# Patient Record
Sex: FEMALE | Race: BLACK OR AFRICAN AMERICAN | Employment: FULL TIME | URBAN - METROPOLITAN AREA
[De-identification: names, ages, dates, MRNs, and addresses within clinical notes are randomized per-mention and may not be internally consistent; named-entity substitution may affect disease eponyms.]

---

## 2022-12-14 ENCOUNTER — HOSPITAL ENCOUNTER (EMERGENCY)
Dept: MRI IMAGING | Age: 42
Discharge: HOME | End: 2022-12-14
Attending: STUDENT IN AN ORGANIZED HEALTH CARE EDUCATION/TRAINING PROGRAM
Payer: COMMERCIAL

## 2022-12-14 ENCOUNTER — APPOINTMENT (OUTPATIENT)
Dept: CT IMAGING | Age: 42
End: 2022-12-14
Attending: STUDENT IN AN ORGANIZED HEALTH CARE EDUCATION/TRAINING PROGRAM
Payer: COMMERCIAL

## 2022-12-14 ENCOUNTER — HOSPITAL ENCOUNTER (EMERGENCY)
Age: 42
Discharge: HOME | End: 2022-12-14
Attending: STUDENT IN AN ORGANIZED HEALTH CARE EDUCATION/TRAINING PROGRAM
Payer: COMMERCIAL

## 2022-12-14 VITALS
SYSTOLIC BLOOD PRESSURE: 106 MMHG | DIASTOLIC BLOOD PRESSURE: 76 MMHG | BODY MASS INDEX: 27.51 KG/M2 | OXYGEN SATURATION: 100 % | WEIGHT: 149.47 LBS | TEMPERATURE: 98.2 F | HEART RATE: 60 BPM | RESPIRATION RATE: 12 BRPM | HEIGHT: 62 IN

## 2022-12-14 DIAGNOSIS — R20.0 LEFT UPPER EXTREMITY NUMBNESS: ICD-10-CM

## 2022-12-14 DIAGNOSIS — R20.0 LEFT FACIAL NUMBNESS: Primary | ICD-10-CM

## 2022-12-14 LAB
ALBUMIN SERPL-MCNC: 4 G/DL (ref 3.5–5)
ALBUMIN/GLOB SERPL: 1 {RATIO} (ref 1.1–2.2)
ALP SERPL-CCNC: 65 U/L (ref 45–117)
ALT SERPL-CCNC: 19 U/L (ref 12–78)
ANION GAP SERPL CALC-SCNC: 9 MMOL/L (ref 5–15)
AST SERPL-CCNC: 22 U/L (ref 15–37)
BASOPHILS # BLD: 0 K/UL (ref 0–0.1)
BASOPHILS NFR BLD: 1 % (ref 0–1)
BILIRUB SERPL-MCNC: 0.3 MG/DL (ref 0.2–1)
BUN SERPL-MCNC: 9 MG/DL (ref 6–20)
BUN/CREAT SERPL: 10 (ref 12–20)
CALCIUM SERPL-MCNC: 9 MG/DL (ref 8.5–10.1)
CHLORIDE SERPL-SCNC: 104 MMOL/L (ref 97–108)
CO2 SERPL-SCNC: 28 MMOL/L (ref 21–32)
CREAT SERPL-MCNC: 0.89 MG/DL (ref 0.55–1.02)
DIFFERENTIAL METHOD BLD: NORMAL
EOSINOPHIL # BLD: 0.1 K/UL (ref 0–0.4)
EOSINOPHIL NFR BLD: 1 % (ref 0–7)
ERYTHROCYTE [DISTWIDTH] IN BLOOD BY AUTOMATED COUNT: 13.2 % (ref 11.5–14.5)
GLOBULIN SER CALC-MCNC: 4.1 G/DL (ref 2–4)
GLUCOSE BLD STRIP.AUTO-MCNC: 83 MG/DL (ref 65–117)
GLUCOSE SERPL-MCNC: 82 MG/DL (ref 65–100)
HCT VFR BLD AUTO: 41.1 % (ref 35–47)
HGB BLD-MCNC: 13.7 G/DL (ref 11.5–16)
IMM GRANULOCYTES # BLD AUTO: 0 K/UL (ref 0–0.04)
IMM GRANULOCYTES NFR BLD AUTO: 0 % (ref 0–0.5)
INR PPP: 1.1 (ref 0.9–1.1)
LYMPHOCYTES # BLD: 2.7 K/UL (ref 0.8–3.5)
LYMPHOCYTES NFR BLD: 37 % (ref 12–49)
MCH RBC QN AUTO: 30.2 PG (ref 26–34)
MCHC RBC AUTO-ENTMCNC: 33.3 G/DL (ref 30–36.5)
MCV RBC AUTO: 90.5 FL (ref 80–99)
MONOCYTES # BLD: 0.7 K/UL (ref 0–1)
MONOCYTES NFR BLD: 10 % (ref 5–13)
NEUTS SEG # BLD: 3.6 K/UL (ref 1.8–8)
NEUTS SEG NFR BLD: 51 % (ref 32–75)
NRBC # BLD: 0 K/UL (ref 0–0.01)
NRBC BLD-RTO: 0 PER 100 WBC
PLATELET # BLD AUTO: 223 K/UL (ref 150–400)
PMV BLD AUTO: 11 FL (ref 8.9–12.9)
POTASSIUM SERPL-SCNC: 3.9 MMOL/L (ref 3.5–5.1)
PROT SERPL-MCNC: 8.1 G/DL (ref 6.4–8.2)
PROTHROMBIN TIME: 10.6 SEC (ref 9–11.1)
RBC # BLD AUTO: 4.54 M/UL (ref 3.8–5.2)
SERVICE CMNT-IMP: NORMAL
SODIUM SERPL-SCNC: 141 MMOL/L (ref 136–145)
WBC # BLD AUTO: 7.1 K/UL (ref 3.6–11)

## 2022-12-14 PROCEDURE — 70551 MRI BRAIN STEM W/O DYE: CPT

## 2022-12-14 PROCEDURE — 99285 EMERGENCY DEPT VISIT HI MDM: CPT

## 2022-12-14 PROCEDURE — 74011000636 HC RX REV CODE- 636: Performed by: STUDENT IN AN ORGANIZED HEALTH CARE EDUCATION/TRAINING PROGRAM

## 2022-12-14 PROCEDURE — 70496 CT ANGIOGRAPHY HEAD: CPT

## 2022-12-14 PROCEDURE — 70450 CT HEAD/BRAIN W/O DYE: CPT

## 2022-12-14 PROCEDURE — 85025 COMPLETE CBC W/AUTO DIFF WBC: CPT

## 2022-12-14 PROCEDURE — 82962 GLUCOSE BLOOD TEST: CPT

## 2022-12-14 PROCEDURE — 80053 COMPREHEN METABOLIC PANEL: CPT

## 2022-12-14 PROCEDURE — 74011250637 HC RX REV CODE- 250/637: Performed by: STUDENT IN AN ORGANIZED HEALTH CARE EDUCATION/TRAINING PROGRAM

## 2022-12-14 PROCEDURE — 36415 COLL VENOUS BLD VENIPUNCTURE: CPT

## 2022-12-14 PROCEDURE — 85610 PROTHROMBIN TIME: CPT

## 2022-12-14 PROCEDURE — 93005 ELECTROCARDIOGRAM TRACING: CPT

## 2022-12-14 RX ORDER — GUAIFENESIN 100 MG/5ML
81 LIQUID (ML) ORAL
Status: COMPLETED | OUTPATIENT
Start: 2022-12-14 | End: 2022-12-14

## 2022-12-14 RX ADMIN — IOPAMIDOL 100 ML: 755 INJECTION, SOLUTION INTRAVENOUS at 18:00

## 2022-12-14 RX ADMIN — ASPIRIN 81 MG: 81 TABLET, CHEWABLE ORAL at 17:36

## 2022-12-14 NOTE — ED TRIAGE NOTES
Patient reports left side facial numbness and left left arm numbness started 10 min ago. Denies weakness, no blood thinner medication.      Signs and symptoms: facial numbness, left arm numbness   Code Stroke activation time: 1646    Provider at bedside time:  Dr. Trevin Parish 1646  VAN score: Negative  Last Known Well (Time): 9438  Blood Glucose Result/Time: 83   Blood Pressure: 115/90  Anticoagulants (List medications): no

## 2022-12-14 NOTE — ED PROVIDER NOTES
Chief Complaint   Patient presents with    Numbness            This is a 53-year-old female otherwise healthy presenting with left-sided facial numbness as well as numbness in her left upper extremity that started abruptly at 0430 which is when she was last known normal.  She was sitting in a vehicle about to attend a party when she developed these symptoms. Says that since she has been in the emergency department, the numbness in her left arm has started to dissipate, she still has some numbness in her left cheek but this has started to improve as well. She denies any associated headache, loss of vision, dizziness or syncope, neck pain, vomiting, speech deficit, facial asymmetry, or lateralizing weakness. She has no history of prior stroke, does not take any anticoagulants. Says that this happened several years ago at which time she followed up with a neurologist and there was some suspicion that she might have multiple sclerosis but she was ultimately told that this was not the case. Denies fevers, chest pain, shortness of breath, abdominal pain, or any other systemic complaints. Symptoms are moderate in nature, improving without intervention. Review of Systems   Constitutional:  Negative for fever. HENT:  Negative for facial swelling. Eyes:  Negative for visual disturbance. Respiratory:  Negative for shortness of breath. Cardiovascular:  Negative for chest pain. Gastrointestinal:  Negative for abdominal pain and vomiting. Musculoskeletal:  Negative for neck pain. Skin:  Negative for rash. Neurological:  Negative for dizziness, facial asymmetry and headaches. Psychiatric/Behavioral:  Negative for confusion. No past medical history on file. CORRECTION:  Obtained by me, none per patient    No past surgical history on file. CORRECTION:  Obtained by me, negative for prior head or neck surgery      No family history on file.  CORRECTION:  Obtained by me, social history negative for tobacco or alcohol use    Social History     Socioeconomic History    Marital status: Not on file     Spouse name: Not on file    Number of children: Not on file    Years of education: Not on file    Highest education level: Not on file   Occupational History    Not on file   Tobacco Use    Smoking status: Not on file    Smokeless tobacco: Not on file   Substance and Sexual Activity    Alcohol use: Not on file    Drug use: Not on file    Sexual activity: Not on file   Other Topics Concern    Not on file   Social History Narrative    Not on file     Social Determinants of Health     Financial Resource Strain: Not on file   Food Insecurity: Not on file   Transportation Needs: Not on file   Physical Activity: Not on file   Stress: Not on file   Social Connections: Not on file   Intimate Partner Violence: Not on file   Housing Stability: Not on file         ALLERGIES: Patient has no known allergies. Vitals:    12/14/22 1653 12/14/22 1719 12/14/22 1738   BP: (!) 115/90 (!) 136/90 117/83   Pulse: 78 75 75   Resp: 16 16 13   Temp:  98.3 °F (36.8 °C)    SpO2: 100% 100% 100%   Weight: 67.8 kg (149 lb 7.6 oz)     Height: 5' 1.5\" (1.562 m)         Physical exam  General:  Awake and alert, NAD  HEENT:  NC/AT, equal pupils, moist mucous membranes  Neck:   Normal inspection, full range of motion  Cardiac:  RRR, no murmurs  Respiratory:  Clear bilaterally, no wheezes, rales, rhonchi  Abdomen:  Soft and nontender, nondistended  Extremities: Warm and well perfused, no peripheral edema  Neuro:  +Decreased sensation in the left cheek only, cranial nerves otherwise grossly intact specifically speech is fluent, there is no facial asymmetry, there are no visual field cuts. Moving all extremities symmetrically without gross motor deficit. No sensory deficit in the upper or lower extremities.   Skin:   No rashes, ecchymoses, or pallor      Recent Results (from the past 12 hour(s))   GLUCOSE, POC    Collection Time: 12/14/22  4:46 PM Result Value Ref Range    Glucose (POC) 83 65 - 117 mg/dL    Performed by Joesph MORGAN)    CBC WITH AUTOMATED DIFF    Collection Time: 12/14/22  5:00 PM   Result Value Ref Range    WBC 7.1 3.6 - 11.0 K/uL    RBC 4.54 3.80 - 5.20 M/uL    HGB 13.7 11.5 - 16.0 g/dL    HCT 41.1 35.0 - 47.0 %    MCV 90.5 80.0 - 99.0 FL    MCH 30.2 26.0 - 34.0 PG    MCHC 33.3 30.0 - 36.5 g/dL    RDW 13.2 11.5 - 14.5 %    PLATELET 603 806 - 276 K/uL    MPV 11.0 8.9 - 12.9 FL    NRBC 0.0 0  WBC    ABSOLUTE NRBC 0.00 0.00 - 0.01 K/uL    NEUTROPHILS 51 32 - 75 %    LYMPHOCYTES 37 12 - 49 %    MONOCYTES 10 5 - 13 %    EOSINOPHILS 1 0 - 7 %    BASOPHILS 1 0 - 1 %    IMMATURE GRANULOCYTES 0 0.0 - 0.5 %    ABS. NEUTROPHILS 3.6 1.8 - 8.0 K/UL    ABS. LYMPHOCYTES 2.7 0.8 - 3.5 K/UL    ABS. MONOCYTES 0.7 0.0 - 1.0 K/UL    ABS. EOSINOPHILS 0.1 0.0 - 0.4 K/UL    ABS. BASOPHILS 0.0 0.0 - 0.1 K/UL    ABS. IMM. GRANS. 0.0 0.00 - 0.04 K/UL    DF AUTOMATED     METABOLIC PANEL, COMPREHENSIVE    Collection Time: 12/14/22  5:00 PM   Result Value Ref Range    Sodium 141 136 - 145 mmol/L    Potassium 3.9 3.5 - 5.1 mmol/L    Chloride 104 97 - 108 mmol/L    CO2 28 21 - 32 mmol/L    Anion gap 9 5 - 15 mmol/L    Glucose 82 65 - 100 mg/dL    BUN 9 6 - 20 MG/DL    Creatinine 0.89 0.55 - 1.02 MG/DL    BUN/Creatinine ratio 10 (L) 12 - 20      eGFR >60 >60 ml/min/1.73m2    Calcium 9.0 8.5 - 10.1 MG/DL    Bilirubin, total 0.3 0.2 - 1.0 MG/DL    ALT (SGPT) 19 12 - 78 U/L    AST (SGOT) 22 15 - 37 U/L    Alk.  phosphatase 65 45 - 117 U/L    Protein, total 8.1 6.4 - 8.2 g/dL    Albumin 4.0 3.5 - 5.0 g/dL    Globulin 4.1 (H) 2.0 - 4.0 g/dL    A-G Ratio 1.0 (L) 1.1 - 2.2     PROTHROMBIN TIME + INR    Collection Time: 12/14/22  5:00 PM   Result Value Ref Range    INR 1.1 0.9 - 1.1      Prothrombin time 10.6 9.0 - 11.1 sec   EKG, 12 LEAD, INITIAL    Collection Time: 12/14/22  5:26 PM   Result Value Ref Range    Ventricular Rate 68 BPM    Atrial Rate 68 BPM    P-R Interval 168 ms    QRS Duration 70 ms    Q-T Interval 380 ms    QTC Calculation (Bezet) 404 ms    Calculated P Axis 20 degrees    Calculated R Axis 87 degrees    Calculated T Axis 47 degrees    Diagnosis       Normal sinus rhythm  Normal ECG  No previous ECGs available        CTA CODE NEURO HEAD AND NECK W CONT    Result Date: 12/14/2022  There is no evidence of aneurysm, dissection or hemodynamically significant stenosis. . There is no intracranial mass, hemorrhage or evidence of acute infarction. No acute intracranial process is identified. .     CT CODE NEURO HEAD WO CONTRAST    Result Date: 12/14/2022  No acute abnormality. Procedures - none unless documented below    EKG as interpreted by me: Normal sinus rhythm at a rate of 68, normal axis and intervals, grossly no ST or T wave changes suggesting acute ischemia. ED course: Labs, EKG and imaging reviewed. NIHSS 1 at the time of arrival, with resolving symptoms, no disabling deficit present. Discussed with teleneurology Diana Michel) who examined the patient and agreed that given all of the above she was not an appropriate candidate for systemic thrombolytics. Advised obtaining MRI brain, if unrevealing for acute infarct he felt that the patient could safely be discharged to follow up with outpatient neurology, continue aspirin 81 mg in the meantime. Patient signed out to Dr. Lima Rouse, please refer to his progress note for the patient's final impression and disposition.     Impression: Left sided facial numbness, left upper extremity numbness  Disposition: Pending

## 2022-12-14 NOTE — DISCHARGE INSTRUCTIONS
- Start taking aspirin 81 mg daily, follow up with neurology as soon as possible to be reevaluated  - Return for severe headache, neck pain, loss of vision, speech problems, dizziness, weakness/numbness, or any new or worsening symptoms

## 2022-12-14 NOTE — ED NOTES
46 Dr. Marques Green is on screen to evaluate. 1718:Eval complete. [facility] 1 per Dr. Marques Green.

## 2022-12-15 LAB
ATRIAL RATE: 68 BPM
CALCULATED P AXIS, ECG09: 20 DEGREES
CALCULATED R AXIS, ECG10: 87 DEGREES
CALCULATED T AXIS, ECG11: 47 DEGREES
DIAGNOSIS, 93000: NORMAL
P-R INTERVAL, ECG05: 168 MS
Q-T INTERVAL, ECG07: 380 MS
QRS DURATION, ECG06: 70 MS
QTC CALCULATION (BEZET), ECG08: 404 MS
VENTRICULAR RATE, ECG03: 68 BPM

## 2022-12-15 NOTE — ED NOTES
Verbal shift change report given to Elif Sorenson RN (oncoming nurse) by Emely Mendiola RN (offgoing nurse). Report included the following information SBAR, ED Summary, MAR, and Recent Results.

## 2023-08-04 ENCOUNTER — HOSPITAL ENCOUNTER (OUTPATIENT)
Facility: HOSPITAL | Age: 43
Discharge: HOME | End: 2023-08-04
Attending: FAMILY MEDICINE
Payer: COMMERCIAL

## 2023-08-04 ENCOUNTER — TRANSCRIBE ORDERS (OUTPATIENT)
Facility: HOSPITAL | Age: 43
End: 2023-08-04

## 2023-08-04 DIAGNOSIS — M25.542 PAIN IN JOINT OF LEFT HAND: ICD-10-CM

## 2023-08-04 DIAGNOSIS — M25.541 PAIN IN JOINT OF RIGHT HAND: ICD-10-CM

## 2023-08-04 DIAGNOSIS — M25.541 PAIN IN JOINT OF RIGHT HAND: Primary | ICD-10-CM

## 2023-08-04 PROCEDURE — 73130 X-RAY EXAM OF HAND: CPT
